# Patient Record
Sex: FEMALE | Race: WHITE | NOT HISPANIC OR LATINO | Employment: OTHER | URBAN - METROPOLITAN AREA
[De-identification: names, ages, dates, MRNs, and addresses within clinical notes are randomized per-mention and may not be internally consistent; named-entity substitution may affect disease eponyms.]

---

## 2024-04-02 ENCOUNTER — HOSPITAL ENCOUNTER (EMERGENCY)
Facility: HOSPITAL | Age: 74
Discharge: HOME | End: 2024-04-02
Attending: EMERGENCY MEDICINE
Payer: MEDICARE

## 2024-04-02 VITALS
OXYGEN SATURATION: 98 % | RESPIRATION RATE: 18 BRPM | SYSTOLIC BLOOD PRESSURE: 161 MMHG | DIASTOLIC BLOOD PRESSURE: 76 MMHG | WEIGHT: 145 LBS | HEART RATE: 78 BPM | BODY MASS INDEX: 28.47 KG/M2 | TEMPERATURE: 98 F | HEIGHT: 60 IN

## 2024-04-02 DIAGNOSIS — S61.211A LACERATION OF LEFT INDEX FINGER WITHOUT FOREIGN BODY WITHOUT DAMAGE TO NAIL, INITIAL ENCOUNTER: Primary | ICD-10-CM

## 2024-04-02 PROCEDURE — 99283 EMERGENCY DEPT VISIT LOW MDM: CPT | Mod: 25

## 2024-04-02 PROCEDURE — 25000003 PHARM REV CODE 250: Performed by: NURSE PRACTITIONER

## 2024-04-02 PROCEDURE — 12001 RPR S/N/AX/GEN/TRNK 2.5CM/<: CPT

## 2024-04-02 RX ORDER — LIDOCAINE HYDROCHLORIDE 10 MG/ML
5 INJECTION, SOLUTION EPIDURAL; INFILTRATION; INTRACAUDAL; PERINEURAL
Status: COMPLETED | OUTPATIENT
Start: 2024-04-02 | End: 2024-04-02

## 2024-04-02 RX ORDER — CEPHALEXIN 500 MG/1
500 CAPSULE ORAL 4 TIMES DAILY
Qty: 20 CAPSULE | Refills: 0 | Status: SHIPPED | OUTPATIENT
Start: 2024-04-02 | End: 2024-04-07

## 2024-04-02 RX ORDER — CEPHALEXIN 250 MG/1
500 CAPSULE ORAL
Status: COMPLETED | OUTPATIENT
Start: 2024-04-02 | End: 2024-04-02

## 2024-04-02 RX ORDER — ACETAMINOPHEN 500 MG
1000 TABLET ORAL
Status: COMPLETED | OUTPATIENT
Start: 2024-04-02 | End: 2024-04-02

## 2024-04-02 RX ADMIN — LIDOCAINE HYDROCHLORIDE 50 MG: 10 INJECTION, SOLUTION EPIDURAL; INFILTRATION; INTRACAUDAL; PERINEURAL at 08:04

## 2024-04-02 RX ADMIN — ACETAMINOPHEN 1000 MG: 500 TABLET ORAL at 08:04

## 2024-04-02 RX ADMIN — CEPHALEXIN 500 MG: 250 CAPSULE ORAL at 08:04

## 2024-04-03 NOTE — DISCHARGE INSTRUCTIONS
Your laceration was repaired in the ED with dissolvable sutures. it will dissolve or fall out within a week or two.Take the medications as prescribed. Return to the Emergency Department if you experience discharge from your laceration, redness around your laceration, warmth around your laceration, fever, vomiting, numbness, tingling, or any other concerning symptoms.   Thank you for choosing us for your care.

## 2024-04-03 NOTE — ED PROVIDER NOTES
CHIEF COMPLAINT  Chief Complaint   Patient presents with    Laceration     Patient with laceration to left index finger; Bleeding controlled on arrival       HISTORY OF PRESENT ILLNESS  Vickie Camarena is a 74 y.o. female  presenting with left index finger laceration with kitchen knife, bleeding controlled.  No other specific aggravating or relieving factors otherwise.      PAST MEDICAL HISTORY  History reviewed. No pertinent past medical history.    CURRENT MEDICATIONS    No current facility-administered medications for this encounter.    Current Outpatient Medications:     cephALEXin (KEFLEX) 500 MG capsule, Take 1 capsule (500 mg total) by mouth 4 (four) times daily. for 5 days, Disp: 20 capsule, Rfl: 0    ALLERGIES    Review of patient's allergies indicates:   Allergen Reactions    Iodinated contrast media Rash       SURGICAL HISTORY    No past surgical history on file.    SOCIAL HISTORY    Social History     Socioeconomic History    Marital status:        FAMILY HISTORY    No family history on file.    REVIEW OF SYSTEMS    Review of Systems   Skin:         + laceration      All other systems reviewed and are negative    VITAL SIGNS:   BP (!) 161/76   Pulse 78   Temp 98.2 °F (36.8 °C) (Oral)   Resp 18   Ht 5' (1.524 m)   Wt 65.8 kg (145 lb)   SpO2 98%   BMI 28.32 kg/m²      Physical Exam  Constitutional:       Appearance: Normal appearance.   HENT:      Head: Normocephalic.   Cardiovascular:      Rate and Rhythm: Normal rate.   Pulmonary:      Effort: Pulmonary effort is normal. No respiratory distress.      Breath sounds: Normal breath sounds.   Abdominal:      Palpations: Abdomen is soft.   Musculoskeletal:         General: Normal range of motion.   Skin:     General: Skin is warm.      Capillary Refill: Capillary refill takes less than 2 seconds.      Findings: Laceration (left index finger, dorsal aspect, proximal phalanx, 1cm linear laceration, full ROM, NVI) present.   Neurological:      General:  No focal deficit present.      Mental Status: She is alert.      GCS: GCS eye subscore is 4. GCS verbal subscore is 5. GCS motor subscore is 6.   Psychiatric:         Attention and Perception: Attention normal.         Mood and Affect: Mood normal.         Speech: Speech normal.       Vitals and nursing note reviewed.     LABS    Labs Reviewed - No data to display      EKG    No results found for this or any previous visit.      RADIOLOGY    No orders to display         PROCEDURES    Lac Repair    Date/Time: 4/2/2024 8:44 PM    Performed by: Janie Powell NP  Authorized by: Shaniqua Seo MD    Consent:     Consent obtained:  Verbal    Consent given by:  Patient    Risks, benefits, and alternatives were discussed: yes      Risks discussed:  Infection, need for additional repair, nerve damage, poor wound healing, poor cosmetic result, pain, retained foreign body, tendon damage and vascular damage    Alternatives discussed:  No treatment  Universal protocol:     Procedure explained and questions answered to patient or proxy's satisfaction: yes      Patient identity confirmed:  Verbally with patient  Anesthesia:     Anesthesia method:  Local infiltration    Local anesthetic:  Lidocaine 1% w/o epi  Laceration details:     Location:  Finger    Finger location:  L index finger    Length (cm):  1    Depth (mm):  2  Pre-procedure details:     Preparation:  Patient was prepped and draped in usual sterile fashion  Treatment:     Area cleansed with:  Saline    Amount of cleaning:  Standard    Irrigation solution:  Sterile saline    Irrigation volume:  1000 ml    Irrigation method:  Pressure wash  Skin repair:     Repair method:  Sutures    Suture size:  4-0    Suture material:  Fast-absorbing gut    Suture technique:  Simple interrupted    Number of sutures:  3  Approximation:     Approximation:  Close  Repair type:     Repair type:  Simple  Post-procedure details:     Dressing:  Non-adherent dressing and bulky dressing     Procedure completion:  Tolerated well, no immediate complications      Medications   cephALEXin capsule 500 mg (500 mg Oral Given 4/2/24 2031)   acetaminophen tablet 1,000 mg (1,000 mg Oral Given 4/2/24 2000)   LIDOcaine (PF) 10 mg/ml (1%) injection 50 mg (50 mg Infiltration Given 4/2/24 2000)                Medical Decision Making  Vickie Camarena is a 74 y.o. female  presenting with left index finger laceration with kitchen knife, bleeding controlled.  No other specific aggravating or relieving factors otherwise.  Differential Diagnosis includes but is not limited to: laceration, contusion, abrasion. Differentials I have considered and consider less likely: fracture   The patients laceration was repaired at bedside. All attempts were made to thoroughly irrigate and clean the wound prior to closure. The wound was explored through full ROM, and no obvious tendon/ligament injuries were identified. I spoke with the patient in regards to wound care and cleanliness. I explained that I would like pts wound to be reexamined by her primary care physician within 72 hours. I explained that despite thorough irrigation and cleaning, there is a chance that the wound becomes infected. I counseled on s/s consistent with a local wound infection such as (but not limited to) swelling, erythema, purulent drainage, increasing pain, pain with range of motion, fever. I also explained that there is a small chance of a small retained foreign body/tendon or ligament laceration that could not be visualized. Should the patient develop more systemic signs of infection, they should return to the ER immediately. Tetanus is up to date.      Problems Addressed:  Laceration of left index finger without foreign body without damage to nail, initial encounter: acute illness or injury    Risk  OTC drugs.  Prescription drug management.  Minor surgery with identified risk factors.           Discontinued Medications    No medications on file       New  Prescriptions    CEPHALEXIN (KEFLEX) 500 MG CAPSULE    Take 1 capsule (500 mg total) by mouth 4 (four) times daily. for 5 days           DISPOSITION  Patient discharged to home in stable condition.        FINAL IMPRESSION    1. Laceration of left index finger without foreign body without damage to nail, initial encounter         Janie Powell NP  04/02/24 3453